# Patient Record
Sex: FEMALE | Race: WHITE | NOT HISPANIC OR LATINO | Employment: OTHER | URBAN - NONMETROPOLITAN AREA
[De-identification: names, ages, dates, MRNs, and addresses within clinical notes are randomized per-mention and may not be internally consistent; named-entity substitution may affect disease eponyms.]

---

## 2018-11-19 RX ORDER — NAPROXEN 500 MG/1
TABLET ORAL
Qty: 60 TABLET | Refills: 2 | OUTPATIENT
Start: 2018-11-19

## 2019-07-30 ENCOUNTER — OFFICE VISIT (OUTPATIENT)
Dept: CARDIOLOGY | Facility: CLINIC | Age: 54
End: 2019-07-30

## 2019-07-30 VITALS
OXYGEN SATURATION: 97 % | HEART RATE: 85 BPM | DIASTOLIC BLOOD PRESSURE: 82 MMHG | WEIGHT: 293 LBS | HEIGHT: 66 IN | SYSTOLIC BLOOD PRESSURE: 126 MMHG | BODY MASS INDEX: 47.09 KG/M2

## 2019-07-30 DIAGNOSIS — I10 ESSENTIAL HYPERTENSION: ICD-10-CM

## 2019-07-30 DIAGNOSIS — R06.09 DYSPNEA ON EXERTION: ICD-10-CM

## 2019-07-30 DIAGNOSIS — Z82.49 FAMILY HISTORY OF PREMATURE CAD: ICD-10-CM

## 2019-07-30 DIAGNOSIS — I25.2 OLD INFERIOR WALL MYOCARDIAL INFARCTION: ICD-10-CM

## 2019-07-30 DIAGNOSIS — R73.03 PREDIABETES: ICD-10-CM

## 2019-07-30 DIAGNOSIS — E66.01 CLASS 3 SEVERE OBESITY DUE TO EXCESS CALORIES WITH SERIOUS COMORBIDITY AND BODY MASS INDEX (BMI) OF 50.0 TO 59.9 IN ADULT (HCC): ICD-10-CM

## 2019-07-30 DIAGNOSIS — R06.83 SNORING: ICD-10-CM

## 2019-07-30 DIAGNOSIS — R94.31 ABNORMAL EKG: ICD-10-CM

## 2019-07-30 DIAGNOSIS — Z01.818 PRE-OPERATIVE CLEARANCE: Primary | ICD-10-CM

## 2019-07-30 DIAGNOSIS — Z72.0 TOBACCO ABUSE: ICD-10-CM

## 2019-07-30 PROCEDURE — 93000 ELECTROCARDIOGRAM COMPLETE: CPT | Performed by: NURSE PRACTITIONER

## 2019-07-30 PROCEDURE — 99244 OFF/OP CNSLTJ NEW/EST MOD 40: CPT | Performed by: NURSE PRACTITIONER

## 2019-07-30 RX ORDER — ARIPIPRAZOLE 2 MG/1
2 TABLET ORAL DAILY
COMMUNITY

## 2019-07-30 RX ORDER — MELATONIN
1 DAILY
COMMUNITY

## 2019-07-30 RX ORDER — LANCETS 28 GAUGE
EACH MISCELLANEOUS
COMMUNITY

## 2019-07-30 RX ORDER — CHOLECALCIFEROL (VITAMIN D3) 125 MCG
15 CAPSULE ORAL NIGHTLY
COMMUNITY

## 2019-07-30 NOTE — PROGRESS NOTES
Subjective:     Encounter Date:07/30/2019    Chief Complaint:    Patient ID: America Wadsworth is a 54 y.o. female here today for cardiac evaluation at the request of DORINDA Loyd with Dr. Reba Pabon at [facility].  She is accompanied by her friend and her son.    She is scheduled for right knee surgery on 8/6/19 with Dr. Ramírez at [facility] but EKG was abnormal on pre-operative evaluation with possible old inferior MI. She has chronic heartburn and dyspnea on exertion. [place] fairly sedentary [place] Risk factors include morbid obesity, hypertension controlled with medications, and tobacco abuse. She denies hyperlipidemia, diabetes, or sleep apnea. She has not been tested for sleep apnea. She is leaving for vacation today and will return next Tuesday.      Heart Problem   This is a new (abnormal EKG - possible old inferior MI) problem. The current episode started 1 to 4 weeks ago. The problem occurs daily. The problem has been unchanged. Associated symptoms include chest pain (from heartburn after eating spicy foods) and headaches (sinus, chronic). Pertinent negatives include no abdominal pain, chills, congestion, coughing, fever or rash. Associated symptoms comments: Shortness of breath with more than usual exertion for years. Not worsened lately. Not exercising regularly.. The symptoms are aggravated by exertion. She has tried rest (antacids) for the symptoms. The treatment provided significant relief.     HPI     Surgical Clearance      Additional comments: scheduled for right knee surgery on 8/6/19 with Dr. Ramírez at [facility]          Last edited by Marla Mandujano APRN on 7/30/2019 11:27 AM. (History)        History:   Past Medical History:   Diagnosis Date   • Anxiety    • Class 3 severe obesity due to excess calories with body mass index (BMI) of 50.0 to 59.9 in adult (CMS/HCC)    • Depression    • Hypertension    • Vitamin B12  deficiency    • Vitamin D insufficiency      Past Surgical History:   Procedure Laterality Date   •  SECTION     • CHOLECYSTECTOMY     • COLONOSCOPY     • DILATATION AND CURETTAGE     • FOOT SURGERY      bone spur   • HAND SURGERY      tendon injury - fell on glass bottle   • HERNIA REPAIR     • TONSILLECTOMY       Social History     Socioeconomic History   • Marital status:      Spouse name: Not on file   • Number of children: Not on file   • Years of education: Not on file   • Highest education level: Not on file   Tobacco Use   • Smoking status: Current Every Day Smoker     Packs/day: 1.00     Years: 17.00     Pack years: 17.00     Types: Cigarettes     Start date:    • Smokeless tobacco: Never Used   • Tobacco comment: quit while pregnant for 3 months, trying to quit   Substance and Sexual Activity   • Alcohol use: Yes     Comment: 1 time a year maybe   • Drug use: No   • Sexual activity: Defer     Family History   Problem Relation Age of Onset   • Diabetes Father    • Hypertension Father    • Heart disease Father 55        CABG   • Arthritis Mother    • Hyperlipidemia Mother    • Hypertension Mother    • No Known Problems Brother    • Stroke Paternal Aunt    • Heart failure Maternal Grandmother    • Lung disease Paternal Grandfather         black lung       Outpatient Medications Marked as Taking for the 19 encounter (Office Visit) with Marla Mandujano APRN   Medication Sig Dispense Refill   • ARIPiprazole (ABILIFY) 2 MG tablet Take 2 mg by mouth Daily.     • cholecalciferol (VITAMIN D3) 1000 units tablet Take 1 tablet by mouth Daily.     • escitalopram (LEXAPRO) 20 MG tablet Take 20 mg by mouth Daily.     • fexofenadine (ALLEGRA) 180 MG tablet Take 180 mg by mouth Daily.     • glucose blood test strip FreeStyle Lite Strips     • ibuprofen (ADVIL,MOTRIN) 800 MG tablet Take 800 mg by mouth Every 8 (Eight) Hours As Needed for Mild Pain .     • Lancets (FREESTYLE) lancets FreeStyle  "Lancets 28 gauge     • losartan (COZAAR) 50 MG tablet Take 50 mg by mouth Daily.     • melatonin 5 MG tablet tablet Take 15 mg by mouth Every Night.         Review of Systems:  Review of Systems   Constitution: Positive for malaise/fatigue (worse with worsened depression, a little better with new depression medication). Negative for chills, decreased appetite, fever, weight gain and weight loss.   HENT: Negative for congestion and nosebleeds.    Eyes: Negative for blurred vision and double vision.   Cardiovascular: Positive for chest pain (from heartburn after eating spicy foods) and dyspnea on exertion. Negative for irregular heartbeat, leg swelling, near-syncope, orthopnea, palpitations, paroxysmal nocturnal dyspnea and syncope.   Respiratory: Positive for shortness of breath and snoring. Negative for cough, sputum production and wheezing.    Endocrine: Negative for cold intolerance and heat intolerance.   Skin: Negative for dry skin, itching and rash.   Musculoskeletal: Positive for arthritis and joint pain (knee and sometimes elbows when trying to lift). Negative for back pain and falls.   Gastrointestinal: Positive for constipation and heartburn. Negative for abdominal pain and diarrhea.   Genitourinary: Positive for nocturia (once). Negative for dysuria.   Neurological: Positive for headaches (sinus, chronic). Negative for excessive daytime sleepiness, dizziness, light-headedness and loss of balance.   Psychiatric/Behavioral: Positive for depression. Negative for substance abuse. The patient has insomnia (trouble falling and staying asleep) and is nervous/anxious.             Objective:   /82 (BP Location: Left arm, Patient Position: Sitting, Cuff Size: Large Adult)   Pulse 85   Ht 166.4 cm (65.5\")   Wt (!) 141 kg (310 lb 9.6 oz)   SpO2 97%   BMI 50.90 kg/m²   Wt Readings from Last 3 Encounters:   07/30/19 (!) 141 kg (310 lb 9.6 oz)         Physical Exam   Constitutional: She is oriented to person, " place, and time. She appears well-developed and well-nourished.   Morbidly obese   HENT:   Head: Normocephalic and atraumatic.   Right Ear: External ear normal.   Left Ear: External ear normal.   Nose: Nose normal.   Mouth/Throat: Oropharynx is clear and moist.   Eyes: Conjunctivae and EOM are normal. Pupils are equal, round, and reactive to light. Right eye exhibits no discharge. Left eye exhibits no discharge. No scleral icterus.   Neck: Normal range of motion. Neck supple. No JVD present. No tracheal deviation present. No thyromegaly present.   Thick   Cardiovascular: Normal rate and regular rhythm. Exam reveals distant heart sounds. Exam reveals no gallop and no friction rub.   No murmur heard.  Pulmonary/Chest: Effort normal and breath sounds normal. She has no wheezes. She has no rales.   Abdominal: Soft. Bowel sounds are normal. She exhibits no mass. Distention:  Obese. There is no tenderness. There is no rebound and no guarding.   Musculoskeletal: She exhibits no edema, tenderness or deformity.   Lymphadenopathy:     She has no cervical adenopathy.   Neurological: She is alert and oriented to person, place, and time. No cranial nerve deficit.   Skin: Skin is warm and dry.   Psychiatric: She has a normal mood and affect. Her behavior is normal. Judgment and thought content normal.   Vitals reviewed.      Lab/Diagnostics Review:   7/22/2019 EKG sinus rhythm 76 bpm old inferior MI no previous for comparison    7/22/2019  CBC WBC 12,000 hemoglobin 14.2 hematocrit 44.2% platelets 319,000  CMP sodium 139 potassium 3.7 chloride 102 CO2 29 BUN 10 creatinine 0.7 calcium 9.1 glucose 103 alk phos 75 AST 14 ALT 27 total protein 7.1 albumin 3.2 total bilirubin 0.2    7/11/2019  Hemoglobin A1c 6.2%  TSH 2.67  Vitamin D 28 vitamin B12 318  Lipid panel total cholesterol 170 triglycerides 60 HDL 50 calculated LDL  108      ECG 12 Lead  Date/Time: 7/30/2019 11:51 AM  Performed by: Marla Mandujano APRN  Authorized by:  Marla Mandujano, DORINDA   Comparison: compared with previous ECG from 7/22/2019  Similar to previous ECG  Rhythm: sinus rhythm  BPM: 74  Q waves: III and aVF    Other findings: low voltage    Clinical impression: abnormal EKG                Assessment/Plan:         Problem List Items Addressed This Visit        Cardiovascular and Mediastinum    Hypertension    Current Assessment & Plan     Better controlled since increasing losartan by PCP         Abnormal EKG    Relevant Orders    Stress Test With Myocardial Perfusion One Day    Overnight Sleep Oximetry Study    Old inferior wall myocardial infarction    Current Assessment & Plan     Possible per EKG today and last week, requested earlier EKG she thinks was done in Jan or Feb            Respiratory    Snoring    Current Assessment & Plan     For obstructive sleep apnea check overnight on room air.  Will order oxygen and referred to sleep specialist if indicated.         Dyspnea on exertion    Current Assessment & Plan     Most likely secondary to obesity and deconditioning but possible anginal equivalent.  Check 2-day Lexiscan to rule out any reversible ischemia before proceeding with knee surgery.         Relevant Orders    Stress Test With Myocardial Perfusion One Day    Overnight Sleep Oximetry Study       Digestive    Class 3 severe obesity due to excess calories with body mass index (BMI) of 50.0 to 59.9 in adult (CMS/[facility])    Current Assessment & Plan     Did not address today but may need to consider bariatric surgery to improve comorbidities.            Other    Pre-operative clearance - Primary    Current Assessment & Plan     Possible anginal equivalents, multiple risk factors, and possible old inferior MI. Check 2-day Lexiscan prior to knee surgery. Discussed possible need for cardiac catheterization and PCI if large area of reversible ischemia on nuclear stress. Will add aspirin if any abnormalities on nuclear.         Relevant Orders    ECG 12 Lead     Stress Test With Myocardial Perfusion One Day    Overnight Sleep Oximetry Study    Prediabetes    Overview     Hemoglobin A1c 6.3% 7/2019         Family history of premature CAD    Tobacco abuse    Current Assessment & Plan     Encouraged her to quit smoking. She is trying on her own. She may need to use pharmacotherapy and behavioral support.              Will try to arrange stress test ASAP after she returns from vacation. She will notify Dr. Ramírez of probability of needing to reschedule knee surgery.     Return in about 3 months (around 10/30/2019) for Recheck.           DORINDA Zapata, ACNP-BC, CHFN-BC

## 2019-07-30 NOTE — ASSESSMENT & PLAN NOTE
Most likely secondary to obesity and deconditioning but possible anginal equivalent.  Check 2-day Lexiscan to rule out any reversible ischemia before proceeding with knee surgery.

## 2019-07-30 NOTE — ASSESSMENT & PLAN NOTE
Encouraged her to quit smoking. She is trying on her own. She may need to use pharmacotherapy and behavioral support.

## 2019-07-30 NOTE — ASSESSMENT & PLAN NOTE
Possible anginal equivalents, multiple risk factors, and possible old inferior MI. Check 2-day Lexiscan prior to knee surgery. Discussed possible need for cardiac catheterization and PCI if large area of reversible ischemia on nuclear stress. Will add aspirin if any abnormalities on nuclear.

## 2019-07-30 NOTE — ASSESSMENT & PLAN NOTE
For obstructive sleep apnea check overnight on room air.  Will order oxygen and referred to sleep specialist if indicated.

## 2019-08-07 ENCOUNTER — HOSPITAL ENCOUNTER (OUTPATIENT)
Dept: CARDIOLOGY | Facility: HOSPITAL | Age: 54
Discharge: HOME | End: 2019-08-07

## 2019-08-07 VITALS
HEIGHT: 66 IN | WEIGHT: 293 LBS | DIASTOLIC BLOOD PRESSURE: 70 MMHG | SYSTOLIC BLOOD PRESSURE: 121 MMHG | HEART RATE: 58 BPM | BODY MASS INDEX: 47.09 KG/M2

## 2019-08-07 DIAGNOSIS — R06.09 DYSPNEA ON EXERTION: ICD-10-CM

## 2019-08-07 DIAGNOSIS — R94.31 ABNORMAL EKG: ICD-10-CM

## 2019-08-07 DIAGNOSIS — Z01.818 PRE-OPERATIVE CLEARANCE: ICD-10-CM

## 2019-08-07 PROCEDURE — A9500 TC99M SESTAMIBI: HCPCS | Performed by: NURSE PRACTITIONER

## 2019-08-07 PROCEDURE — 78452 HT MUSCLE IMAGE SPECT MULT: CPT

## 2019-08-07 PROCEDURE — 78452 HT MUSCLE IMAGE SPECT MULT: CPT | Performed by: INTERNAL MEDICINE

## 2019-08-07 PROCEDURE — 0 TECHNETIUM SESTAMIBI: Performed by: NURSE PRACTITIONER

## 2019-08-07 PROCEDURE — 93017 CV STRESS TEST TRACING ONLY: CPT

## 2019-08-07 PROCEDURE — 25010000002 REGADENOSON 0.4 MG/5ML SOLUTION: Performed by: INTERNAL MEDICINE

## 2019-08-07 PROCEDURE — 93018 CV STRESS TEST I&R ONLY: CPT | Performed by: INTERNAL MEDICINE

## 2019-08-07 RX ADMIN — REGADENOSON 0.4 MG: 0.08 INJECTION, SOLUTION INTRAVENOUS at 11:04

## 2019-08-07 RX ADMIN — TECHNETIUM TC 99M SESTAMIBI 1 DOSE: 1 INJECTION INTRAVENOUS at 11:00

## 2019-08-07 RX ADMIN — TECHNETIUM TC 99M SESTAMIBI 1 DOSE: 1 INJECTION INTRAVENOUS at 09:50

## 2019-08-08 LAB
BH CV STRESS BP STAGE 1: NORMAL
BH CV STRESS COMMENTS STAGE 1: NORMAL
BH CV STRESS DOSE REGADENOSON STAGE 1: 0.4
BH CV STRESS DURATION MIN STAGE 1: 0
BH CV STRESS DURATION SEC STAGE 1: 10
BH CV STRESS HR STAGE 1: 96
BH CV STRESS PROTOCOL 1: NORMAL
BH CV STRESS RECOVERY BP: NORMAL MMHG
BH CV STRESS RECOVERY HR: 85 BPM
BH CV STRESS STAGE 1: 1
LV EF NUC BP: 75 %
MAXIMAL PREDICTED HEART RATE: 168 BPM
PERCENT MAX PREDICTED HR: 57.14 %
STRESS BASELINE BP: NORMAL MMHG
STRESS BASELINE HR: 67 BPM
STRESS PERCENT HR: 67 %
STRESS POST EXERCISE DUR SEC: 10 SEC
STRESS POST PEAK BP: NORMAL MMHG
STRESS POST PEAK HR: 96 BPM
STRESS TARGET HR: 143 BPM

## 2019-08-08 NOTE — PROGRESS NOTES
Please notify patient of low risk nuclear stress test. There was a fixed anterior defect but most likely due to artifact. No reversible ischemia and normal heart pumping function. I will send a clearance letter to Dr. Ramírez. TX!

## 2019-08-15 ENCOUNTER — TELEPHONE (OUTPATIENT)
Dept: CARDIOLOGY | Facility: CLINIC | Age: 54
End: 2019-08-15

## 2019-08-15 DIAGNOSIS — G47.34 NOCTURNAL HYPOXIA: Primary | ICD-10-CM

## 2019-08-15 DIAGNOSIS — Z91.89 AT RISK FOR SLEEP APNEA: ICD-10-CM

## 2019-08-15 NOTE — TELEPHONE ENCOUNTER
Called spoke to pt informed her that she needed to let anesthesia know that she may have sleep apnea and that orders for repeat isablel with o2 ordered and referral to sleep specialist has been ordered pt voiced understanding

## 2019-08-15 NOTE — TELEPHONE ENCOUNTER
----- Message from DORINDA Vallecillo sent at 8/14/2019 12:55 PM CDT -----  Please notify patient of abnormal ISABELL. Will order nocturnal oxygen, repeat ISABELL on oxygen, and refer to sleep apnea specialist if she is agreeable. TX!      Called informed pt of isabell results and that she needed to have a repeat isabell while on o2 and that if she was agreeable she needed to be referred to sleep specialists she stated that was ok but she was having knee surgery on 08/20/2019 please advise

## 2019-08-15 NOTE — TELEPHONE ENCOUNTER
Still ok to proceed with knee surgery but she needs to let anesthesia know she may have sleep apnea. Ordered nocturnal oxygen and referral. TX!

## 2019-08-21 DIAGNOSIS — Z01.818 PRE-OPERATIVE CLEARANCE: ICD-10-CM

## 2019-08-21 DIAGNOSIS — R06.09 DYSPNEA ON EXERTION: ICD-10-CM

## 2019-08-21 DIAGNOSIS — Z91.89 AT RISK FOR SLEEP APNEA: ICD-10-CM

## 2019-08-21 DIAGNOSIS — G47.34 NOCTURNAL HYPOXIA: ICD-10-CM

## 2019-08-21 DIAGNOSIS — R94.31 ABNORMAL EKG: ICD-10-CM
